# Patient Record
Sex: MALE | Race: WHITE | Employment: UNEMPLOYED | URBAN - METROPOLITAN AREA
[De-identification: names, ages, dates, MRNs, and addresses within clinical notes are randomized per-mention and may not be internally consistent; named-entity substitution may affect disease eponyms.]

---
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
## 2024-09-12 ENCOUNTER — TELEPHONE (OUTPATIENT)
Age: 19
End: 2024-09-12

## 2024-09-12 NOTE — TELEPHONE ENCOUNTER
Schedule Instructions    Schedule appointment within 2-4 weeks.  PT is scheduled for 10/22/24 please advise       New Patient Triage  Please Triage:   New Patient-   What is the reason for the patients appointment? Kidney stone er follow up pt is experiencing pain and gross hematuria   Lower right back,   Pt stated that pain has reduced a bit since going to the ER and is not taking any pain medication    Imaging/Lab Results:   KIDNEYS/URETERS: In the right renal pelvis is a 9 mm calculus with a mean   Hounsfield unit of 1066 causing mild prominence of the renal pelvis without   hydronephrosis.     Both kidneys demonstrate a mildly heterogeneous appearance which could   potentially indicate underlying pyelonephritis. No obstructive uropathy or   perinephric stranding is identified.     The right kidney demonstrates a few scattered cystic-appearing lesions at the   mid to lower pole, the largest measuring up to 0.9 cm.     The left kidney at the lower pole laterally demonstrates a small somewhat   ill-defined hypodense lesion.     In the left kidney at the midpole is a 4 mm nonobstructive calculus.     The left kidney at the lower pole posteriorly demonstrate mild cortical   scarring.     Insurance:   Do we accept the pt's insurance or is the patient Self-Pay?  Provider & Plan: [employer]   Member ID#: [id number]     History  Has the pt had any previous urologist? no    Have pt records been requested? No    Has the pt had any outside testing done? [facility]     Does the pt have a personal history of cancer?: no

## 2024-09-13 NOTE — TELEPHONE ENCOUNTER
Patient's follow up appointment post ER visit has been rescheduled to 9/18 as per Bella SUE as patient needs to be seen sooner. This nurse called patient to make him aware of new appointment date and time to which he is agreeable.

## 2024-09-18 ENCOUNTER — OFFICE VISIT (OUTPATIENT)
Dept: UROLOGY | Facility: CLINIC | Age: 19
End: 2024-09-18
Payer: COMMERCIAL

## 2024-09-18 VITALS
OXYGEN SATURATION: 99 % | HEART RATE: 104 BPM | DIASTOLIC BLOOD PRESSURE: 84 MMHG | HEIGHT: 69 IN | BODY MASS INDEX: 24.73 KG/M2 | SYSTOLIC BLOOD PRESSURE: 124 MMHG | TEMPERATURE: 97.8 F | WEIGHT: 167 LBS

## 2024-09-18 DIAGNOSIS — N20.0 STONE IN RENAL PELVIS: ICD-10-CM

## 2024-09-18 DIAGNOSIS — R31.0 GROSS HEMATURIA: Primary | ICD-10-CM

## 2024-09-18 LAB
SL AMB  POCT GLUCOSE, UA: NORMAL
SL AMB LEUKOCYTE ESTERASE,UA: NORMAL
SL AMB POCT BILIRUBIN,UA: NORMAL
SL AMB POCT BLOOD,UA: NORMAL
SL AMB POCT CLARITY,UA: CLEAR
SL AMB POCT COLOR,UA: YELLOW
SL AMB POCT KETONES,UA: NORMAL
SL AMB POCT NITRITE,UA: NORMAL
SL AMB POCT PH,UA: 6.5
SL AMB POCT SPECIFIC GRAVITY,UA: 1.3
SL AMB POCT URINE PROTEIN: NORMAL
SL AMB POCT UROBILINOGEN: 0.2

## 2024-09-18 PROCEDURE — 81002 URINALYSIS NONAUTO W/O SCOPE: CPT | Performed by: PHYSICIAN ASSISTANT

## 2024-09-18 PROCEDURE — 87086 URINE CULTURE/COLONY COUNT: CPT | Performed by: PHYSICIAN ASSISTANT

## 2024-09-18 PROCEDURE — 99204 OFFICE O/P NEW MOD 45 MIN: CPT | Performed by: PHYSICIAN ASSISTANT

## 2024-09-18 RX ORDER — CEPHALEXIN 500 MG/1
CAPSULE ORAL
COMMUNITY
Start: 2024-08-26

## 2024-09-18 NOTE — PROGRESS NOTES
9/18/2024      Chief Complaint   Patient presents with    Nephrolithiasis         Assessment and Plan    Right renal pelvic calculus   Intermittent R flank/abdominal pain  Intermittent gross hematuria  - Urine culture from 8/26/24 negative  - CTA A/P through [facility] completed 8/26/24 - Both kidneys demonstrate a mildly heterogeneous appearance which could potentially indicate underlying pyelonephritis. No obstructive uropathy or   perinephric stranding. Right renal pelvis is a 9 mm calculus causing mild prominence of the renal pelvis without hydronephrosis.   - Obtain CT images   - Due to large size of stone and symptoms, discussed surgical intervention with URS vs ESWL. Reviewed procedure details, risks of both and potential risk of Steinstrasse/need for additional surgery with ESWL particularly. He wishes to proceed with cystoscopy, right ureteroscopy, laser lithotripsy holmium laser, retrograde pyelogram and stent insertion. Consent signed. Case requested. ER precautions reviewed.     History of Present Illness  Allan Chua is a 19 y.o. male here for new patient evaluation of gross hematuria and kidney stone.    He was seen at [facility] ED on 8/26/24 with hematuria and lower abdominal pain. CT completed in ED showing above. He states he has had intermittent issues with right flank/abdominal pain and gross hematuria for the past month. Currently is asymptomatic. Last had symptoms Friday of last week. Denies any prior history of kidney stones or  surgical manipulation in the past.     Review of Systems   Constitutional:  Negative for chills and fever.   Respiratory:  Negative for shortness of breath.    Cardiovascular:  Negative for chest pain.   Gastrointestinal:  Negative for abdominal pain, nausea and vomiting.   Genitourinary:  Negative for difficulty urinating, dysuria, flank pain, frequency, hematuria and urgency.   Neurological:  Negative for dizziness.       Past Medical History  Past Medical History:    Diagnosis Date    Allergic rhinitis     Otitis media     Scleral hemorrhage of left eye 08/11/2014       Past Social History  Past Surgical History:   Procedure Laterality Date    ADENOIDECTOMY  02/15/2012    By Dr. Flower, with tonsillectomy    CIRCUMCISION      TONSILLECTOMY  02/15/2012    By Dr. Flower, with adenoidectomy     Social History     Tobacco Use   Smoking Status Never   Smokeless Tobacco Never       Past Family History  Family History   Problem Relation Age of Onset    Gallbladder disease Mother     Thalassemia Father     Other Father         Hereditary spherocytosis, with spleen enlargement    No Known Problems Sister     Other Brother         Hereditary spherocytosis    Alcohol abuse Neg Hx     Substance Abuse Neg Hx     Mental illness Neg Hx        Past Social history  Social History     Socioeconomic History    Marital status: Single     Spouse name: Not on file    Number of children: Not on file    Years of education: Not on file    Highest education level: Not on file   Occupational History    Not on file   Tobacco Use    Smoking status: Never    Smokeless tobacco: Never   Vaping Use    Vaping status: Never Used   Substance and Sexual Activity    Alcohol use: No    Drug use: No    Sexual activity: Not on file     Comment: defer   Other Topics Concern    Not on file   Social History Narrative    Lives with parents, and older brother.    Carbon monoxide detector in home    Smoke detector in home    No tobacco/smoke exposure in home    Pets: cat    Wears seat belt.    Guns in the home locked in safe.     Social Determinants of Health     Financial Resource Strain: Not on file   Food Insecurity: Not on file   Transportation Needs: Not on file   Physical Activity: Not on file   Stress: Not on file   Social Connections: Not on file   Intimate Partner Violence: Not on file   Housing Stability: Not on file       Current Medications  Current Outpatient Medications   Medication Sig Dispense Refill     "cephalexin (KEFLEX) 500 mg capsule take 1 capsule by mouth four times a day for 7 days      cetirizine (ZyrTEC) 10 mg tablet Take 10 mg by mouth daily      fluticasone (FLONASE) 50 mcg/act nasal spray USE 1 SPRAY IN EACH NOSTRIL ONCE DAILY 32 g 3    pseudoephedrine (SUDAFED) 30 mg tablet Take 60 mg by mouth every 4 (four) hours as needed for congestion       No current facility-administered medications for this visit.       Allergies  No Known Allergies      The following portions of the patient's history were reviewed and updated as appropriate: allergies, current medications, past medical history, past social history, past surgical history and problem list.      Vitals  Vitals:    09/18/24 1302   BP: 124/84   BP Location: Left arm   Patient Position: Sitting   Cuff Size: Standard   Pulse: 104   Temp: 97.8 °F (36.6 °C)   TempSrc: Temporal   SpO2: 99%   Weight: 75.8 kg (167 lb)   Height: 5' 9\" (1.753 m)           Physical Exam  Physical Exam  Constitutional:       Appearance: Normal appearance.   HENT:      Head: Normocephalic and atraumatic.      Right Ear: External ear normal.      Left Ear: External ear normal.      Nose: Nose normal.   Eyes:      General: No scleral icterus.     Conjunctiva/sclera: Conjunctivae normal.   Cardiovascular:      Rate and Rhythm: Normal rate and regular rhythm.      Pulses: Normal pulses.      Heart sounds: Normal heart sounds.   Pulmonary:      Effort: Pulmonary effort is normal.      Breath sounds: Normal breath sounds.   Abdominal:      Tenderness: There is no right CVA tenderness or left CVA tenderness.   Musculoskeletal:         General: Normal range of motion.      Cervical back: Normal range of motion.   Skin:     General: Skin is warm and dry.   Neurological:      General: No focal deficit present.      Mental Status: He is alert and oriented to person, place, and time.   Psychiatric:         Mood and Affect: Mood normal.         Behavior: Behavior normal.         Thought " "Content: Thought content normal.         Judgment: Judgment normal.           Results  Recent Results (from the past 1 hour(s))   POCT urine dip    Collection Time: 09/18/24  1:06 PM   Result Value Ref Range    LEUKOCYTE ESTERASE,UA -     NITRITE,UA -     SL AMB POCT UROBILINOGEN 0.2     POCT URINE PROTEIN -      PH,UA 6.5     BLOOD,UA +     SPECIFIC GRAVITY,UA 1.3010     KETONES,UA -     BILIRUBIN,UA -     GLUCOSE, UA -      COLOR,UA yellow     CLARITY,UA clear    ]  No results found for: \"PSA\"  Lab Results   Component Value Date    CALCIUM 9.8 08/26/2024    K 3.9 08/26/2024    CO2 25 08/26/2024     08/26/2024    BUN 14 08/26/2024    CREATININE 0.98 08/26/2024     No results found for: \"WBC\", \"HGB\", \"HCT\", \"MCV\", \"PLT\"        Orders  Orders Placed This Encounter   Procedures    POCT urine dip       Bella Osman      "

## 2024-09-20 LAB — BACTERIA UR CULT: NORMAL

## 2024-09-23 ENCOUNTER — PREP FOR PROCEDURE (OUTPATIENT)
Dept: UROLOGY | Facility: CLINIC | Age: 19
End: 2024-09-23

## 2024-09-23 NOTE — PROGRESS NOTES
Called patient l/m to call back and schedule his surgical procedure for 10/21/24 at [facility] with

## 2024-10-02 ENCOUNTER — TELEPHONE (OUTPATIENT)
Age: 19
End: 2024-10-02

## 2024-10-28 ENCOUNTER — ANESTHESIA EVENT (OUTPATIENT)
Dept: ANESTHESIOLOGY | Facility: HOSPITAL | Age: 19
End: 2024-10-28

## 2024-10-28 ENCOUNTER — ANESTHESIA (OUTPATIENT)
Dept: ANESTHESIOLOGY | Facility: HOSPITAL | Age: 19
End: 2024-10-28

## 2024-10-29 ENCOUNTER — APPOINTMENT (OUTPATIENT)
Dept: LAB | Facility: HOSPITAL | Age: 19
End: 2024-10-29
Attending: UROLOGY
Payer: COMMERCIAL

## 2024-10-29 DIAGNOSIS — R39.89 SUSPECTED UTI: ICD-10-CM

## 2024-10-29 DIAGNOSIS — R31.0 GROSS HEMATURIA: ICD-10-CM

## 2024-10-29 PROCEDURE — 87077 CULTURE AEROBIC IDENTIFY: CPT

## 2024-10-29 PROCEDURE — 87086 URINE CULTURE/COLONY COUNT: CPT

## 2024-10-31 LAB — BACTERIA UR CULT: ABNORMAL

## 2024-11-04 NOTE — PRE-PROCEDURE INSTRUCTIONS
Pre-Surgery Instructions:   Medication Instructions    cetirizine (ZyrTEC) 10 mg tablet Hold day of surgery.    fluticasone (FLONASE) 50 mcg/act nasal spray Take day of surgery.    pseudoephedrine (SUDAFED) 30 mg tablet Hold day of surgery.   Medication instructions for day surgery reviewed. Please use only a sip of water to take your instructed medications. Avoid all over the counter vitamins, supplements and NSAIDS for one week prior to surgery per anesthesia guidelines. Tylenol is ok to take as needed.     You will receive a call one business day prior to surgery with an arrival time and hospital directions. If your surgery is scheduled on a Monday, the hospital will be calling you on the Friday prior to your surgery. If you have not heard from anyone by 8pm, please call the hospital supervisor through the hospital  at [phone]. ([place] [phone] or [place] [phone]).    Do not eat or drink anything after midnight the night before your surgery, including candy, mints, lifesavers, or chewing gum. Do not drink alcohol 24hrs before your surgery. Try not to smoke at least 24hrs before your surgery.       Follow the pre surgery showering instructions as listed in the “My Surgical Experience Booklet” or otherwise provided by your surgeon's office. Do not use a blade to shave the surgical area 1 week before surgery. It is okay to use a clean electric clippers up to 24 hours before surgery. Do not apply any lotions, creams, including makeup, cologne, deodorant, or perfumes after showering on the day of your surgery. Do not use dry shampoo, hair spray, hair gel, or any type of hair products.     No contact lenses, eye make-up, or artificial eyelashes. Remove nail polish, including gel polish, and any artificial, gel, or acrylic nails if possible. Remove all jewelry including rings and body piercing jewelry.     Wear causal clothing that is easy to take on and off. Consider your type of  surgery.    Keep any valuables, jewelry, piercings at home. Please bring any specially ordered equipment (sling, braces) if indicated.    Arrange for a responsible person to drive you to and from the hospital on the day of your surgery. Please confirm the visitor policy for the day of your procedure when you receive your phone call with an arrival time.     Call the surgeon's office with any new illnesses, exposures, or additional questions prior to surgery.    Please reference your “My Surgical Experience Booklet” for additional information to prepare for your upcoming surgery.

## 2024-11-11 ENCOUNTER — ANESTHESIA EVENT (OUTPATIENT)
Dept: PERIOP | Facility: AMBULARY SURGERY CENTER | Age: 19
End: 2024-11-11
Payer: COMMERCIAL

## 2024-11-11 NOTE — ANESTHESIA PREPROCEDURE EVALUATION
Procedure:  CYSTOSCOPY URETEROSCOPY WITH LITHOTRIPSY HOLMIUM LASER, RETROGRADE PYELOGRAM AND INSERTION STENT URETERAL (Right: Bladder)    Relevant Problems   MUSCULOSKELETAL   (+) Scoliosis        Physical Exam    Airway    Mallampati score: II  TM Distance: >3 FB  Neck ROM: full     Dental   No notable dental hx     Cardiovascular      Pulmonary      Other Findings        Anesthesia Plan  ASA Score- 2     Anesthesia Type- general with ASA Monitors.         Additional Monitors:     Airway Plan: LMA.           Plan Factors-Exercise tolerance (METS): >4 METS.    Chart reviewed.   Existing labs reviewed. Patient summary reviewed.    Patient is not a current smoker.              Induction- intravenous.    Postoperative Plan-         Informed Consent- Anesthetic plan and risks discussed with patient and mother.  I personally reviewed this patient with the CRNA. Discussed and agreed on the Anesthesia Plan with the CRNA..

## 2024-11-12 ENCOUNTER — APPOINTMENT (OUTPATIENT)
Dept: RADIOLOGY | Facility: AMBULARY SURGERY CENTER | Age: 19
End: 2024-11-12
Payer: COMMERCIAL

## 2024-11-12 ENCOUNTER — TELEPHONE (OUTPATIENT)
Dept: UROLOGY | Facility: CLINIC | Age: 19
End: 2024-11-12

## 2024-11-12 ENCOUNTER — ANESTHESIA (OUTPATIENT)
Dept: PERIOP | Facility: AMBULARY SURGERY CENTER | Age: 19
End: 2024-11-12
Payer: COMMERCIAL

## 2024-11-12 ENCOUNTER — HOSPITAL ENCOUNTER (OUTPATIENT)
Facility: AMBULARY SURGERY CENTER | Age: 19
Setting detail: OUTPATIENT SURGERY
Discharge: HOME | End: 2024-11-12
Attending: UROLOGY | Admitting: UROLOGY
Payer: COMMERCIAL

## 2024-11-12 VITALS
RESPIRATION RATE: 18 BRPM | TEMPERATURE: 98.5 F | HEIGHT: 69 IN | OXYGEN SATURATION: 98 % | WEIGHT: 160 LBS | SYSTOLIC BLOOD PRESSURE: 114 MMHG | DIASTOLIC BLOOD PRESSURE: 71 MMHG | HEART RATE: 71 BPM | BODY MASS INDEX: 23.7 KG/M2

## 2024-11-12 DIAGNOSIS — R31.0 GROSS HEMATURIA: ICD-10-CM

## 2024-11-12 DIAGNOSIS — N20.0 STONE IN RENAL PELVIS: ICD-10-CM

## 2024-11-12 PROCEDURE — 52356 CYSTO/URETERO W/LITHOTRIPSY: CPT | Performed by: UROLOGY

## 2024-11-12 PROCEDURE — C2625 STENT, NON-COR, TEM W/DEL SY: HCPCS | Performed by: UROLOGY

## 2024-11-12 PROCEDURE — C1769 GUIDE WIRE: HCPCS | Performed by: UROLOGY

## 2024-11-12 PROCEDURE — 82360 CALCULUS ASSAY QUANT: CPT | Performed by: UROLOGY

## 2024-11-12 PROCEDURE — NC001 PR NO CHARGE: Performed by: UROLOGY

## 2024-11-12 PROCEDURE — C1894 INTRO/SHEATH, NON-LASER: HCPCS | Performed by: UROLOGY

## 2024-11-12 PROCEDURE — C1758 CATHETER, URETERAL: HCPCS | Performed by: UROLOGY

## 2024-11-12 PROCEDURE — 74420 UROGRAPHY RTRGR +-KUB: CPT

## 2024-11-12 DEVICE — INLAY OPTIMA URETERAL STENT W/O GUIDEWIRE
Type: IMPLANTABLE DEVICE | Status: FUNCTIONAL
Brand: BARD® INLAY OPTIMA® URETERAL STENT

## 2024-11-12 RX ORDER — OXYBUTYNIN CHLORIDE 5 MG/1
5 TABLET, EXTENDED RELEASE ORAL DAILY PRN
Status: DISCONTINUED | OUTPATIENT
Start: 2024-11-12 | End: 2024-11-12 | Stop reason: HOSPADM

## 2024-11-12 RX ORDER — FENTANYL CITRATE/PF 50 MCG/ML
25 SYRINGE (ML) INJECTION
Status: DISCONTINUED | OUTPATIENT
Start: 2024-11-12 | End: 2024-11-12 | Stop reason: HOSPADM

## 2024-11-12 RX ORDER — HYDROMORPHONE HCL/PF 1 MG/ML
0.2 SYRINGE (ML) INJECTION
Status: DISCONTINUED | OUTPATIENT
Start: 2024-11-12 | End: 2024-11-12 | Stop reason: HOSPADM

## 2024-11-12 RX ORDER — PROPOFOL 10 MG/ML
INJECTION, EMULSION INTRAVENOUS AS NEEDED
Status: DISCONTINUED | OUTPATIENT
Start: 2024-11-12 | End: 2024-11-12

## 2024-11-12 RX ORDER — KETOROLAC TROMETHAMINE 30 MG/ML
INJECTION, SOLUTION INTRAMUSCULAR; INTRAVENOUS AS NEEDED
Status: DISCONTINUED | OUTPATIENT
Start: 2024-11-12 | End: 2024-11-12

## 2024-11-12 RX ORDER — MAGNESIUM HYDROXIDE 1200 MG/15ML
LIQUID ORAL AS NEEDED
Status: DISCONTINUED | OUTPATIENT
Start: 2024-11-12 | End: 2024-11-12 | Stop reason: HOSPADM

## 2024-11-12 RX ORDER — ONDANSETRON 2 MG/ML
4 INJECTION INTRAMUSCULAR; INTRAVENOUS ONCE AS NEEDED
Status: DISCONTINUED | OUTPATIENT
Start: 2024-11-12 | End: 2024-11-12 | Stop reason: HOSPADM

## 2024-11-12 RX ORDER — DEXAMETHASONE SODIUM PHOSPHATE 10 MG/ML
INJECTION, SOLUTION INTRAMUSCULAR; INTRAVENOUS AS NEEDED
Status: DISCONTINUED | OUTPATIENT
Start: 2024-11-12 | End: 2024-11-12

## 2024-11-12 RX ORDER — ACETAMINOPHEN 325 MG/1
650 TABLET ORAL EVERY 6 HOURS PRN
Status: DISCONTINUED | OUTPATIENT
Start: 2024-11-12 | End: 2024-11-12 | Stop reason: HOSPADM

## 2024-11-12 RX ORDER — DICLOFENAC POTASSIUM 50 MG/1
50 TABLET, FILM COATED ORAL 2 TIMES DAILY PRN
Qty: 28 TABLET | Refills: 0 | Status: SHIPPED | OUTPATIENT
Start: 2024-11-12 | End: 2024-11-26

## 2024-11-12 RX ORDER — MIDAZOLAM HYDROCHLORIDE 2 MG/2ML
INJECTION, SOLUTION INTRAMUSCULAR; INTRAVENOUS AS NEEDED
Status: DISCONTINUED | OUTPATIENT
Start: 2024-11-12 | End: 2024-11-12

## 2024-11-12 RX ORDER — FENTANYL CITRATE 50 UG/ML
INJECTION, SOLUTION INTRAMUSCULAR; INTRAVENOUS AS NEEDED
Status: DISCONTINUED | OUTPATIENT
Start: 2024-11-12 | End: 2024-11-12

## 2024-11-12 RX ORDER — ONDANSETRON 2 MG/ML
INJECTION INTRAMUSCULAR; INTRAVENOUS AS NEEDED
Status: DISCONTINUED | OUTPATIENT
Start: 2024-11-12 | End: 2024-11-12

## 2024-11-12 RX ORDER — SODIUM CHLORIDE, SODIUM LACTATE, POTASSIUM CHLORIDE, CALCIUM CHLORIDE 600; 310; 30; 20 MG/100ML; MG/100ML; MG/100ML; MG/100ML
125 INJECTION, SOLUTION INTRAVENOUS CONTINUOUS
Status: DISCONTINUED | OUTPATIENT
Start: 2024-11-12 | End: 2024-11-12 | Stop reason: HOSPADM

## 2024-11-12 RX ORDER — OXYBUTYNIN CHLORIDE 5 MG/1
5 TABLET, EXTENDED RELEASE ORAL DAILY PRN
Qty: 30 TABLET | Refills: 0 | Status: SHIPPED | OUTPATIENT
Start: 2024-11-12

## 2024-11-12 RX ORDER — TAMSULOSIN HYDROCHLORIDE 0.4 MG/1
0.4 CAPSULE ORAL
Qty: 30 CAPSULE | Refills: 0 | Status: SHIPPED | OUTPATIENT
Start: 2024-11-12

## 2024-11-12 RX ORDER — CEFAZOLIN SODIUM 1 G/50ML
1000 SOLUTION INTRAVENOUS ONCE
Status: COMPLETED | OUTPATIENT
Start: 2024-11-12 | End: 2024-11-12

## 2024-11-12 RX ORDER — LIDOCAINE HYDROCHLORIDE 10 MG/ML
0.5 INJECTION, SOLUTION EPIDURAL; INFILTRATION; INTRACAUDAL; PERINEURAL ONCE AS NEEDED
Status: DISCONTINUED | OUTPATIENT
Start: 2024-11-12 | End: 2024-11-12 | Stop reason: HOSPADM

## 2024-11-12 RX ADMIN — DEXAMETHASONE SODIUM PHOSPHATE 10 MG: 10 INJECTION, SOLUTION INTRAMUSCULAR; INTRAVENOUS at 07:29

## 2024-11-12 RX ADMIN — ACETAMINOPHEN 650 MG: 325 TABLET ORAL at 09:00

## 2024-11-12 RX ADMIN — FENTANYL CITRATE 25 MCG: 50 INJECTION INTRAMUSCULAR; INTRAVENOUS at 08:00

## 2024-11-12 RX ADMIN — ONDANSETRON 4 MG: 2 INJECTION INTRAMUSCULAR; INTRAVENOUS at 07:29

## 2024-11-12 RX ADMIN — FENTANYL CITRATE 25 MCG: 50 INJECTION INTRAMUSCULAR; INTRAVENOUS at 07:45

## 2024-11-12 RX ADMIN — FENTANYL CITRATE 25 MCG: 50 INJECTION INTRAMUSCULAR; INTRAVENOUS at 07:50

## 2024-11-12 RX ADMIN — SODIUM CHLORIDE, SODIUM LACTATE, POTASSIUM CHLORIDE, AND CALCIUM CHLORIDE 125 ML/HR: .6; .31; .03; .02 INJECTION, SOLUTION INTRAVENOUS at 09:01

## 2024-11-12 RX ADMIN — PROPOFOL 200 MG: 10 INJECTION, EMULSION INTRAVENOUS at 07:29

## 2024-11-12 RX ADMIN — CEFAZOLIN SODIUM 1000 MG: 1 SOLUTION INTRAVENOUS at 07:34

## 2024-11-12 RX ADMIN — MIDAZOLAM 2 MG: 1 INJECTION INTRAMUSCULAR; INTRAVENOUS at 07:27

## 2024-11-12 RX ADMIN — KETOROLAC TROMETHAMINE 30 MG: 30 INJECTION, SOLUTION INTRAMUSCULAR; INTRAVENOUS at 07:56

## 2024-11-12 RX ADMIN — FENTANYL CITRATE 25 MCG: 50 INJECTION INTRAMUSCULAR; INTRAVENOUS at 07:47

## 2024-11-12 NOTE — DISCHARGE INSTR - AVS FIRST PAGE
Allan,        I was able to break the stone in your right kidney into tiny pieces.  I was able to send some of the pieces for analysis so that we know what type of stone you created.  This will give us more information in the future to help prevent future stone buildup.    The remaining stone in your kidney was broken into tiny pieces that will pass on their own.  You would therefore pass little grains of sand along your stent for the next few days.    Your ureter was pretty tight and inflamed, so I opted not to leave a string on the stent.  This stent will be able to come out in the office in a few weeks.    Please see the postoperative instructions for details.    Please call the office with any questions or concerns.        Sincerely,  Tariq Colvin        You had procedure on your ureter and kidney.      You had ureteral stent placed.  This is a tube that is placed in your ureter to keep urine draining from your kidney to your bladder.  It may cause discomfort in the form of back spasms or lower abdominal spasms.  This is normal and can be treated with medications if need be.      You may see some blood in your urine.  This is normal.  Please drink plenty of fluids.      Please call the office if you notice that you are passing large blood clots or if you are unable to urinate.      You have been given a prescription for Flomax.  Please take this daily while your ureteral stent is in place. This medication can occasionally cause lightheadedness. Please stop medication if you experience this or any other concerning side effects.      You have been given a prescription for oxybutynin.  Please take this daily as needed for stent discomfort. Please note that this medication may have side effects including but not limited to: dry eyes, dry mouth, constipation, urinary retention. Please drink plenty of water with this medication.      You may take Tylenol as needed for pain.    You have additionally been given a  prescription for an anti-inflammatory medication called diclofenac. You may take this for pain not controlled with Tylenol.    Pain control plan: Having a ureteral stent is often very uncomfortable. In order to stay on top of your pain, please take over the counter Tylenol around the clock. Additionally take Flomax daily with your stent in place whether or not you are having discomfort. Additionally if you are having discomfort, you need to take your oxybutynin daily, as this helps a lot with bladder spasms and stent discomfort. If all of these medications are still not helping, you additionally can take diclofenac as prescribed. This is a strong anti-inflammatory medication that can help with your pain.     Most importantly, please drink lots of fluids, as this is the best way to avoid pain with your stent!      You may shower and bathe as usual when you get home.    You do not have any incisions and can resume typical physical activities, as long as you are not having too much discomfort with stent in place.    Please call the office or present to the ED if you experience concerning signs or symptoms including but not limited to: fevers, chills, nausea, vomiting, worsening flank pain, worsening abdominal pain, passage of large blood clots in the urine, inability to urinate.    You will follow up in the office in 2 to 3 weeks to have your stent removed. Office will call you with details.

## 2024-11-12 NOTE — TELEPHONE ENCOUNTER
Patient status post right ureteroscopy at [facility] on 11/12/2024    He has right sided 6 x 26 double-J ureteral stent in place.  No string.    Can we please arrange for him to follow-up in the office for cystoscopy, stent removal in 2 to 3 weeks?

## 2024-11-12 NOTE — OP NOTE
OPERATIVE REPORT  PATIENT NAME: Allan Chua    :  2005  MRN: [id number]  Pt Location: [facility] OR ROOM 04    SURGERY DATE: 2024    Surgeons and Role:     * Tariq Colvin DO - Primary    Preop Diagnosis:  Gross hematuria [R31.0]  Stone in renal pelvis [N20.0]    Post-Op Diagnosis Codes:     * Gross hematuria [R31.0]     * Stone in renal pelvis [N20.0]    Procedure(s):        Cystourethroscopy  Right retrograde pyelogram with live fluoroscopic interpretation  Right ureteroscopy  Laser lithotripsy  Stone basket extraction  Right ureteral stent placement        Specimen(s):  ID Type Source Tests Collected by Time Destination   A :  Calculus Kidney, Right STONE ANALYSIS Tariq Colvin DO 2024 [id number]        Estimated Blood Loss:   Minimal    Drains:  Ureteral Internal Stent Right ureter 6 Fr. (Active)   Number of days: 0       Anesthesia Type:   General    Operative Indications:  Gross hematuria [R31.0]  Stone in renal pelvis [N20.0]        19 y.o. old male with urolithiasis     Presented to [facility] with lower abdominal pain and hematuria on 2024.  Imaging demonstrated nonobstructing kidney stones     CTA abdomen pelvis with and without contrast 2024 (read from the [facility] system): 9 mm stone right renal pelvis without hydronephrosis; few scattered cystic appearing lesions at the mid to lower pole and right kidney, largest measuring up to 9 mm; left kidney at lower pole laterally demonstrates small somewhat ill-defined hypodense lesion; 4 mm nonobstructing left midpole stone     Images from above were transferred over to the [facility] system and were personally reviewed        Patient seen in urology office and opted for elective right sided kidney stone treatment        He was consented for cystoscopy, right retrograde pyelogram, right ureteroscopy, laser lithotripsy, stone basket extraction, right ureteral stent placement        Operative Findings:          No  meatal stenosis  No urethral strictures  Prostate not enlarged  Bilateral ureteral orifices in orthotopic positions  No bladder lesions  No stones in bladder  No trabeculations  No diverticuli  Right retrograde pyelogram: Mild hydronephrosis, positive filling defect in the renal pelvis consistent with kidney stone  Right pyeloscopy: 9 mm stone encountered in the renal pelvis.  Stone was initially fragmented using 272 µm laser fiber.  The larger fragments were removed using Skylight basket.  The remaining fragments were dusted using the laser fiber.  Back out ureteroscopy: No stone fragments, positive urothelial splitting at the mid ureter due to tight ureter and advancement of the access sheath  Successfully placed 6 x 26 double-J ureteral stent with no string attached              Complications:   None    Procedure and Technique:               Patient identified in the preop holding area.  Consent was obtained.  Risks and benefits of the procedure were explained to the patient.  Patient was in agreement.  Patient was brought back to the OR and placed upon the table.  Bilateral lower extremity SCDs were placed and turned on.  Patient received IV Ancef for antibiotics.  Patient underwent smooth induction of anesthesia.  Bilateral lower extremities were placed in stirrups.  Patient was in dorsal lithotomy position.  Area was prepped and draped in sterile fashion.  Timeout was performed by the OR team.     Case began with insertion of 21 Syrian rigid cystoscope.  Pan cystourethroscopy was performed.  See the above findings for details.     Attention was turned toward the right ureteral orifice.      5 Syrian open-ended catheter was advanced through the bridge of the scope toward the ureteral orifice.  Guidewire was advanced through the 5 Syrian open-ended catheter, into the ureter, and coiled in renal pelvis under fluoroscopic guidance.      Dual-lumen catheter was advanced over the wire and into the ureter under  fluoroscopic guidance.  Retrograde pyelogram was performed at this time.  See the above findings for details.  Second wire was placed through the dual-lumen catheter and coiled into the renal pelvis under fluoroscopic guidance.  Dual-lumen catheter was removed over the 2 wires in a push pull fashion.    It was decided that an access sheath would be utilized.  11-13 Fr 45 cm access sheath was advanced over one of the wires under fluoroscopic guidance toward the proximal ureter.  Wire and inner sheath were removed. Flexible reusable ureteroscope was assembled.  Scope was advanced into the renal pelvis.  Pyeloscopy was performed.  See the above findings for details regarding stone works.    .  Backout flexible ureteroscopy was then performed.  See above findings for details.    The ureteroscope was then removed from the bladder and urethra.     Cystoscope was reentered into the bladder over the wire toward the right ureteral orifice.  6 x 26 double-J ureteral stent was advanced over the wire toward the renal pelvis under fluoroscopic guidance.  Wire was removed and stent was deployed.  Good proximal curl was seen on fluoroscopy.  Good distal curl was seen on direct cystoscopy.      There was no string left on the stent.    Bladder was emptied and scope was removed.     Patient was uneventfully awoken from anesthesia and extubated.  Patient was brought to PACU in good condition.               A qualified resident physician was not available.    Patient Disposition:  PACU              SIGNATURE: Tariq Colvin DO  DATE: November 12, 2024  TIME: 8:19 AM      Plan  - Right-sided 6 x 26 double-J ureteral stent in place.  Patient can have cystoscopy, stent removal in about 2 weeks.

## 2024-11-12 NOTE — ANESTHESIA POSTPROCEDURE EVALUATION
Post-Op Assessment Note    CV Status:  Stable  Pain Score: 1    Pain management: adequate       Mental Status:  Alert and awake   Hydration Status:  Euvolemic   PONV Controlled:  Controlled   Airway Patency:  Patent     Post Op Vitals Reviewed: Yes    No anethesia notable event occurred.    Staff: Anesthesiologist           Last Filed PACU Vitals:  Vitals Value Taken Time   Temp 97.9 °F (36.6 °C) 11/12/24 0843   Pulse 73 11/12/24 0843   /71 11/12/24 0843   Resp 14 11/12/24 0843   SpO2 97 % 11/12/24 0843       Modified K[place]ia:  Activity: 2 (11/12/2024  9:21 AM)  Respiration: 2 (11/12/2024  9:21 AM)  Circulation: 2 (11/12/2024  9:21 AM)  Consciousness: 2 (11/12/2024  9:21 AM)  Oxygen Saturation: 2 (11/12/2024  9:21 AM)  Modified Keshia Score: 10 (11/12/2024  9:21 AM)

## 2024-11-12 NOTE — H&P
UROLOGY H&P NOTE     Patient Identifiers: Allan Chua (MRN [id number])    Date of Service: 11/12/2024    History of Present Illness:     Allan Chua is a 19 y.o. old with a history of urolithiasis    Past Medical, Past Surgical History:     Past Medical History:   Diagnosis Date    Allergic rhinitis     Otitis media     Scleral hemorrhage of left eye 08/11/2014   :    Past Surgical History:   Procedure Laterality Date    ADENOIDECTOMY  02/15/2012    By Dr. Flower, with tonsillectomy    CIRCUMCISION      TONSILLECTOMY  02/15/2012    By Dr. Flower, with adenoidectomy   :    Medications, Allergies:     Current Facility-Administered Medications:     ceFAZolin (ANCEF) IVPB (premix in dextrose) 1,000 mg 50 mL, Once    lactated ringers infusion, Continuous    lidocaine (PF) (XYLOCAINE-MPF) 1 % injection 0.5 mL, Once PRN    Allergies:  No Known Allergies:    Social and Family History:   Social History:   Social History     Tobacco Use    Smoking status: Never    Smokeless tobacco: Never   Vaping Use    Vaping status: Never Used   Substance Use Topics    Alcohol use: No    Drug use: No   .    Social History     Tobacco Use   Smoking Status Never   Smokeless Tobacco Never       Family History:  Family History   Problem Relation Age of Onset    Gallbladder disease Mother     Thalassemia Father     Other Father         Hereditary spherocytosis, with spleen enlargement    No Known Problems Sister     Other Brother         Hereditary spherocytosis    Alcohol abuse Neg Hx     Substance Abuse Neg Hx     Mental illness Neg Hx    :     Review of Systems:     General: Fever, chills, or night sweats: negative  Cardiac: Negative for chest pain.    Pulmonary: Negative for shortness of breath.  Gastrointestinal: Abdominal pain negative.  Nausea, vomiting, or diarrhea negative,  Genitourinary: See HPI above.  Patient does not have hematuria.  All other systems queried were negative.    Physical Exam:   General: Patient is pleasant and  "in NAD. Awake and alert  /80   Pulse 95   Temp 97.5 °F (36.4 °C) (Temporal)   Resp 18   Ht 5' 9\" (1.753 m)   Wt 72.6 kg (160 lb)   SpO2 100%   BMI 23.63 kg/m² Temp (24hrs), Av.5 °F (36.4 °C), Min:97.5 °F (36.4 °C), Max:97.5 °F (36.4 °C)  current; Temperature: 97.5 °F (36.4 °C)  No intake/output data recorded.  Cardiac: Peripheral edema: negative  Pulmonary: Non-labored breathing  Abdomen: Soft, non-tender, non-distended.  No surgical scars.  No masses, tenderness, hernias noted.    Genitourinary: Negative CVA tenderness, negative suprapubic tenderness.      Labs:   No results found for: \"HGB\", \"HCT\", \"WBC\", \"PLT\"]    Lab Results   Component Value Date    K 3.9 2024     2024    CO2 25 2024    BUN 14 2024    CREATININE 0.98 2024    CALCIUM 9.8 2024   ]    Imaging:   I personally reviewed the images and report of the following studies, and reviewed them with the patient:    Reviewed, see below      ASSESSMENT:     19 y.o. old male with urolithiasis    Presented to [facility] with lower abdominal pain and hematuria on 2024.  Imaging demonstrated nonobstructing kidney stones    CTA abdomen pelvis with and without contrast 2024 (read from the [place] [employer] system): 9 mm stone right renal pelvis without hydronephrosis; few scattered cystic appearing lesions at the mid to lower pole and right kidney, largest measuring up to 9 mm; left kidney at lower pole laterally demonstrates small somewhat ill-defined hypodense lesion; 4 mm nonobstructing left midpole stone    Images from above were transferred over to the [employer] system and were personally reviewed      Patient seen in urology office and opted for elective right sided kidney stone treatment      He was consented for cystoscopy, right retrograde pyelogram, right ureteroscopy, laser lithotripsy, stone basket extraction, right ureteral stent placement        We discussed ureteroscopy procedure.    I " "explained that ureteroscopy consisted of patient going to sleep and having scope initially placed into the urethra and bladder.  From there, x-rays would be shot in the ureter and kidney in order to assess for safety wire placement and stone location.  At that point, if possible, a smaller scope would be placed into the ureter and/or kidney to look for stones.  Once the stones were located, they would either be removed with a basket, or lasered into smaller pieces.  Once they were broken up into smaller pieces, they would either be \"dusted\" into very small fragments that would pass on their own or \"fragmented\" into slightly larger pieces that would be able to be removed in their entirety with the basket.    I explained that after the procedure, most patients needed placement of a ureteral stent.  I explained that ureteral stent is essentially a straw with 2 curly ends. One curl is in the kidney and the other curl is in the bladder.  The stent would allow urine to safely pass from the kidney to the bladder without obstruction.  I explained that in the vast majority of circumstances, the placement of the ureteral stent was not permanent.  I explained that sometimes we are able to leave a string coming out of the end of the stent which comes out of the urethra.  I explained that these types of patients will get instructions in their discharge packet which tells them when to pull the string and remove the stent in its entirety.  I also explained that there are circumstances where we are not able to do this and there is no string coming off the stent.  In these cases, the patient's would need to come to the office for a quick 2-minute procedure called cystoscopy, removal of ureteral stent.    I also explained that there are unfortunately times where we are not able to get up with a smaller scope into the ureter.  In the circumstances, we place ureteral stent and have patient come back to the OR a few weeks later so that " the ureter is more dilated and can better tolerate a scope.    I explained that based on data from the urine culture and the appearance of the urine during the case, the patient may or may not be discharged with antibiotics after the procedure.    I did explain that having ureteral stent in place can be quite uncomfortable for some patients.  I explained that I do not routinely give patient's narcotics for this type of procedure.  The patient verbalized understanding.  I did state that I often prescribe medications to help with stent discomfort including but not limited to: Daily tamsulosin 0.4 mg, daily as needed oxybutynin 5 mg XR, as needed diclofenac 50 mg twice daily.  I also told the patient that I often have patients use over-the-counter Tylenol around-the-clock for the first few days.  I also explained the most important thing for pain often is staying very well-hydrated and that patients should plan to drink plenty of water after the procedure.    I explained that it can be normal to have blood in urine after this procedure.  I explained that as long as the urine was lighter than fruit punch and that there were no blood clots being passed, and that the patient was able to urinate, nothing urgently needed to be done about blood in the urine.  I did explain that it was very important to stay hydrated after the procedure.    We also went over the risk of infection from the procedure.  I explained that if the urine did appear to have a very infected appearance, there are times where we have to simply place a ureteral stent and defer definitive stone management a few weeks later after the patient gets additional antibiotics.  I also explained that there is a low, but nonzero risk of developing a serious infection after stone treatment which would potentially require hospitalization and IV antibiotics.    I also explained that there was a risk of injury to urethra, bladder, ureter, kidneys during the procedure.   I explained that if there appeared to be any serious injury to the urethra bladder, we would likely just leave L[place] catheter for a number of days.  I explained that if there appeared to be extensive injury to the ureter or kidney, we often would elect to leave a ureteral stent in place for a longer period of time.  I explained that in extremely rare circumstances, there would be a severe injury to the ureter or kidney which would actually require placement of percutaneous nephrostomy tube in order for proper drainage of the urine.    I also explained that there are of course risks to getting general anesthesia such as cardiac or pulmonary complications.  I also explained that there could be risk of developing blood clots.    Additionally, we did review alternative treatments which include extracorporal shockwave lithotripsy, percutaneous nephrolithotomy, observation.  We discussed the risks and benefits of each of these strategies.          PLAN:       -preop ancef  -OR plan as above  -dc home from pacu

## 2024-11-12 NOTE — ANESTHESIA POSTPROCEDURE EVALUATION
Post-Op Assessment Note    CV Status:  Stable  Pain Score: 0    Pain management: adequate       Mental Status:  Alert and awake   Hydration Status:  Euvolemic   PONV Controlled:  Controlled   Airway Patency:  Patent     Post Op Vitals Reviewed: Yes    No anethesia notable event occurred.    Staff: CRNA           Last Filed PACU Vitals:  Vitals Value Taken Time   Temp 98.6 824   Pulse 102    /79    Resp 16    SpO2 98        Modified Keshia:  No data recorded

## 2024-11-12 NOTE — TELEPHONE ENCOUNTER
Patient is currently scheduled for cysto stent removal on 11/26 @ 11:00 am with Jessica. Plan to confirm appointment at time of post op call.

## 2024-11-13 NOTE — TELEPHONE ENCOUNTER
Post Op Note    Allan Chua is a 19 y.o. male s/p CYSTOSCOPY URETEROSCOPY WITH LITHOTRIPSY HOLMIUM LASER, RETROGRADE PYELOGRAM AND INSERTION STENT URETERAL (Right: Bladder)  performed 11/12/204 by Dr. Colvin.  Allan Chua  is seen at the [place] office.     How would you rate your pain on a scale from 1 to 10, 10 being the worst pain ever? Just discomfort with urination and occasional bladder spasms     Have you had a fever? No    Have your bowel movements been regular? Yes    Do you have any difficulty urinating? No    Do you have any other questions or concerns that I can address at this time?     Patient denies any concerns at this time. We reviewed stent colic symptoms, increased hydration, and prn medications. Patient confirmed cysto stent removal with VIKRAM Lopez on 11/26/24 @ 11:00 am in the [place] office.

## 2024-11-18 LAB
CALCIUM OXALATE DIHYDRATE MFR STONE IR: 30 %
COLOR STONE: NORMAL
COM MFR STONE: 40 %
COMMENT-STONE3: NORMAL
COMPOSITION: NORMAL
HYDROXYAPATITE 24H ENGDIFF UR: 30 %
LABORATORY COMMENT REPORT: NORMAL
PHOTO: NORMAL
SIZE STONE: NORMAL MM
SPEC SOURCE SUBJ: NORMAL
STONE ANALYSIS-IMP: NORMAL
STONE ANALYSIS-IMP: NORMAL
WT STONE: 10 MG

## 2024-11-25 NOTE — PROGRESS NOTES
Cystoscopy     Date/Time  11/26/2024 11:00 AM     Performed by  Jessica Menjivar PA-C   Authorized by  Jessica Menjivar PA-C     Universal Protocol:  procedure performed by consultantConsent: Verbal consent obtained. Written consent obtained.  Consent given by: patient  Patient understanding: patient states understanding of the procedure being performed  Patient consent: the patient's understanding of the procedure matches consent given  Procedure consent: procedure consent matches procedure scheduled  Patient identity confirmed: verbally with patient      Procedure Details:  Procedure type: simple removal of a foreign body, stone, or stent    Patient tolerance: Patient tolerated the procedure well with no immediate complications    Additional Procedure Details: Patient cleaned and draped in sterile fashion. Urojet injected into urethra. Cystoscope introduced in urethra and advanced into bladder with visualization and then subsequent removal of ureteral stent using stent grasper device.            Assessment and Plan:  Nephrolithiasis  - UA today positive for leukocytes and blood  - Keflex x3 days sent to pharmacy   - Follow up in 6 weeks with US prior to visit  - ER precautions    Jessica Menjivar PA-C

## 2024-11-26 ENCOUNTER — PROCEDURE VISIT (OUTPATIENT)
Dept: UROLOGY | Facility: CLINIC | Age: 19
End: 2024-11-26
Payer: COMMERCIAL

## 2024-11-26 VITALS
WEIGHT: 158.6 LBS | SYSTOLIC BLOOD PRESSURE: 122 MMHG | HEIGHT: 69 IN | RESPIRATION RATE: 18 BRPM | OXYGEN SATURATION: 98 % | BODY MASS INDEX: 23.49 KG/M2 | TEMPERATURE: 98.4 F | DIASTOLIC BLOOD PRESSURE: 86 MMHG | HEART RATE: 90 BPM

## 2024-11-26 DIAGNOSIS — N20.0 STONE IN RENAL PELVIS: Primary | ICD-10-CM

## 2024-11-26 LAB
SL AMB  POCT GLUCOSE, UA: NORMAL
SL AMB LEUKOCYTE ESTERASE,UA: NORMAL
SL AMB POCT BILIRUBIN,UA: NORMAL
SL AMB POCT BLOOD,UA: NORMAL
SL AMB POCT CLARITY,UA: YELLOW
SL AMB POCT COLOR,UA: CLEAR
SL AMB POCT KETONES,UA: NORMAL
SL AMB POCT NITRITE,UA: NORMAL
SL AMB POCT PH,UA: 6
SL AMB POCT SPECIFIC GRAVITY,UA: 1.01
SL AMB POCT URINE PROTEIN: NORMAL
SL AMB POCT UROBILINOGEN: 5

## 2024-11-26 PROCEDURE — 52310 CYSTOSCOPY AND TREATMENT: CPT | Performed by: PHYSICIAN ASSISTANT

## 2024-11-26 PROCEDURE — 81002 URINALYSIS NONAUTO W/O SCOPE: CPT | Performed by: PHYSICIAN ASSISTANT

## 2024-11-26 RX ORDER — CEPHALEXIN 500 MG/1
500 CAPSULE ORAL EVERY 12 HOURS SCHEDULED
Qty: 6 CAPSULE | Refills: 0 | Status: SHIPPED | OUTPATIENT
Start: 2024-11-26 | End: 2024-11-29

## 2024-12-04 DIAGNOSIS — N20.0 STONE IN RENAL PELVIS: ICD-10-CM

## 2024-12-04 RX ORDER — TAMSULOSIN HYDROCHLORIDE 0.4 MG/1
0.4 CAPSULE ORAL
Qty: 90 CAPSULE | Refills: 0 | Status: SHIPPED | OUTPATIENT
Start: 2024-12-04

## 2024-12-04 RX ORDER — OXYBUTYNIN CHLORIDE 5 MG/1
5 TABLET, EXTENDED RELEASE ORAL DAILY PRN
Qty: 90 TABLET | Refills: 0 | Status: SHIPPED | OUTPATIENT
Start: 2024-12-04

## 2025-01-07 ENCOUNTER — HOSPITAL ENCOUNTER (OUTPATIENT)
Dept: ULTRASOUND IMAGING | Facility: HOSPITAL | Age: 20
Discharge: HOME | End: 2025-01-07
Payer: COMMERCIAL

## 2025-01-07 DIAGNOSIS — N20.0 STONE IN RENAL PELVIS: ICD-10-CM

## 2025-01-07 PROCEDURE — 76775 US EXAM ABDO BACK WALL LIM: CPT

## 2025-01-22 NOTE — PROGRESS NOTES
"Name: Allan Chua      : 2005      MRN: [id number]  Encounter Provider: Jessica Menjivar PA-C  Encounter Date: 2025   Encounter department: [facility] [place]  :  Assessment & Plan  Stone in renal pelvis  CT abdomen pelvic 24 showing bilateral stones  Underwent URS and subsequent stent removal  US kidney and bladder showing 4 mm left stone and possible 2 mm right stone  Asymptomatic  Consider nephrology referral  Stone diet handout provided  Annual follow up with US prior to visit   Orders:    US kidney and bladder; Future        History of Present Illness   Allan Chua is a 19 y.o. male who presents for follow up.    Doing well after ureteroscopy and stent removal. Denies stones.     Review of Systems   Constitutional:  Negative for activity change, appetite change, chills and fever.   HENT:  Negative for congestion and trouble swallowing.    Respiratory:  Negative for cough and shortness of breath.    Cardiovascular:  Negative for chest pain, palpitations and leg swelling.   Gastrointestinal:  Negative for abdominal pain, constipation, diarrhea, nausea and vomiting.   Genitourinary:  Negative for difficulty urinating, dysuria, flank pain, frequency, hematuria and urgency.   Musculoskeletal:  Negative for back pain and gait problem.   Skin:  Negative for wound.   Allergic/Immunologic: Negative for immunocompromised state.   Neurological:  Negative for dizziness and syncope.   Hematological:  Does not bruise/bleed easily.   Psychiatric/Behavioral:  Negative for confusion.    All other systems reviewed and are negative.         Objective   /72 (BP Location: Left arm, Patient Position: Sitting, Cuff Size: Standard)   Pulse 78   Temp 97.5 °F (36.4 °C)   Resp 18   Ht 5' 9\" (1.753 m)   Wt 76.1 kg (167 lb 12.8 oz)   SpO2 98%   BMI 24.78 kg/m²     Physical Exam  Constitutional:       Appearance: Normal appearance.   HENT:      Head: Normocephalic.      Nose: " "Nose normal.      Mouth/Throat:      Pharynx: Oropharynx is clear.   Eyes:      Extraocular Movements: Extraocular movements intact.      Pupils: Pupils are equal, round, and reactive to light.   Pulmonary:      Effort: Pulmonary effort is normal.   Musculoskeletal:         General: Normal range of motion.      Cervical back: Normal range of motion.   Skin:     General: Skin is warm.   Neurological:      General: No focal deficit present.      Mental Status: He is alert and oriented to person, place, and time. Mental status is at baseline.   Psychiatric:         Mood and Affect: Mood normal.         Behavior: Behavior normal.         Thought Content: Thought content normal.         Judgment: Judgment normal.          Results  No results found for: \"PSA\"  Lab Results   Component Value Date    CALCIUM 9.8 08/26/2024    K 3.9 08/26/2024    CO2 25 08/26/2024     08/26/2024    BUN 14 08/26/2024    CREATININE 0.98 08/26/2024     No results found for: \"WBC\", \"HGB\", \"HCT\", \"MCV\", \"PLT\"    Office Urine Dip  No results found for this or any previous visit (from the past hour).]      "

## 2025-01-23 ENCOUNTER — OFFICE VISIT (OUTPATIENT)
Dept: UROLOGY | Facility: CLINIC | Age: 20
End: 2025-01-23
Payer: COMMERCIAL

## 2025-01-23 VITALS
WEIGHT: 167.8 LBS | SYSTOLIC BLOOD PRESSURE: 126 MMHG | TEMPERATURE: 97.5 F | BODY MASS INDEX: 24.85 KG/M2 | RESPIRATION RATE: 18 BRPM | HEART RATE: 78 BPM | HEIGHT: 69 IN | OXYGEN SATURATION: 98 % | DIASTOLIC BLOOD PRESSURE: 72 MMHG

## 2025-01-23 DIAGNOSIS — N20.0 STONE IN RENAL PELVIS: Primary | ICD-10-CM

## 2025-01-23 PROCEDURE — 99213 OFFICE O/P EST LOW 20 MIN: CPT | Performed by: PHYSICIAN ASSISTANT

## 2025-01-23 NOTE — ASSESSMENT & PLAN NOTE
CT abdomen pelvic 8/26/24 showing bilateral stones  Underwent URS and subsequent stent removal  US kidney and bladder showing 4 mm left stone and possible 2 mm right stone  Asymptomatic  Consider nephrology referral  Stone diet handout provided  Annual follow up with US prior to visit   Orders:    US kidney and bladder; Future

## (undated) DEVICE — GUIDEWIRE STRGHT TIP 0.035 IN  SOLO PLUS

## (undated) DEVICE — PREMIUM DRY TRAY LF: Brand: MEDLINE INDUSTRIES, INC.

## (undated) DEVICE — CATH URETERAL 5FR X 70 CM FLEX TIP POLYUR BARD

## (undated) DEVICE — CHLORHEXIDINE 4PCT 4 OZ

## (undated) DEVICE — UROLOGIC DRAIN BAG: Brand: UNBRANDED

## (undated) DEVICE — SHEATH URETERAL ACCESS 11/13FR 45CM PROXIS

## (undated) DEVICE — INVIEW CLEAR LEGGINGS: Brand: CONVERTORS

## (undated) DEVICE — SHEATH URETERAL ACCESS 12/14FR 35CM PROXIS

## (undated) DEVICE — LUBRICANT JELLY SURGILUBE TUBE 4OZ FLIP TOP

## (undated) DEVICE — CATH URET .038 10FR 50CM DUAL LUMEN

## (undated) DEVICE — GLOVE SRG BIOGEL 7.5

## (undated) DEVICE — PACK TUR

## (undated) DEVICE — FIBER STD QUANTA 272 MICRON